# Patient Record
Sex: MALE
[De-identification: names, ages, dates, MRNs, and addresses within clinical notes are randomized per-mention and may not be internally consistent; named-entity substitution may affect disease eponyms.]

---

## 2022-12-08 ENCOUNTER — HOSPITAL ENCOUNTER (OUTPATIENT)
Dept: HOSPITAL 90 - SHCH | Age: 67
Discharge: HOME | End: 2022-12-08
Attending: INTERNAL MEDICINE
Payer: MEDICARE

## 2022-12-08 DIAGNOSIS — I08.3: Primary | ICD-10-CM

## 2022-12-08 DIAGNOSIS — E78.5: ICD-10-CM

## 2022-12-08 PROCEDURE — 93306 TTE W/DOPPLER COMPLETE: CPT

## 2022-12-12 ENCOUNTER — HOSPITAL ENCOUNTER (OUTPATIENT)
Dept: HOSPITAL 90 - SHCH | Age: 67
Discharge: HOME | End: 2022-12-12
Attending: INTERNAL MEDICINE
Payer: MEDICARE

## 2022-12-12 DIAGNOSIS — R07.9: Primary | ICD-10-CM

## 2022-12-12 PROCEDURE — 93017 CV STRESS TEST TRACING ONLY: CPT

## 2022-12-12 PROCEDURE — 96374 THER/PROPH/DIAG INJ IV PUSH: CPT

## 2022-12-12 PROCEDURE — 78452 HT MUSCLE IMAGE SPECT MULT: CPT

## 2024-09-30 ENCOUNTER — HOSPITAL ENCOUNTER (EMERGENCY)
Dept: HOSPITAL 90 - EDH | Age: 69
Discharge: HOME | End: 2024-09-30
Payer: MEDICARE

## 2024-09-30 VITALS
DIASTOLIC BLOOD PRESSURE: 80 MMHG | OXYGEN SATURATION: 99 % | RESPIRATION RATE: 16 BRPM | SYSTOLIC BLOOD PRESSURE: 160 MMHG | TEMPERATURE: 98.6 F | HEART RATE: 90 BPM

## 2024-09-30 VITALS — HEIGHT: 66 IN | WEIGHT: 175.93 LBS | BODY MASS INDEX: 28.27 KG/M2

## 2024-09-30 DIAGNOSIS — Z88.5: ICD-10-CM

## 2024-09-30 DIAGNOSIS — Z88.0: ICD-10-CM

## 2024-09-30 DIAGNOSIS — Z86.73: ICD-10-CM

## 2024-09-30 DIAGNOSIS — E78.00: ICD-10-CM

## 2024-09-30 DIAGNOSIS — N20.2: ICD-10-CM

## 2024-09-30 DIAGNOSIS — R91.1: Primary | ICD-10-CM

## 2024-09-30 LAB
BASOPHILS # BLD AUTO: 0.05 K/UL (ref 0–0.2)
BASOPHILS NFR BLD AUTO: 1 % (ref 0–5)
BUN SERPL-MCNC: 8 MG/DL (ref 7–18)
CHLORIDE SERPL-SCNC: 101 MMOL/L (ref 101–111)
CO2 SERPL-SCNC: 28 MMOL/L (ref 21–32)
CREAT SERPL-MCNC: 1.4 MG/DL (ref 0.5–1.3)
EOSINOPHIL # BLD AUTO: 0.29 K/UL (ref 0–0.7)
EOSINOPHIL NFR BLD AUTO: 5.7 % (ref 0–8)
ERYTHROCYTE [DISTWIDTH] IN BLOOD BY AUTOMATED COUNT: 13.4 % (ref 11–15.5)
GFR SERPL CREATININE-BSD FRML MDRD: 55 ML/MIN (ref 90–?)
GLUCOSE SERPL-MCNC: 168 MG/DL (ref 70–105)
HCT VFR BLD AUTO: 40.6 % (ref 42–54)
IMM GRANULOCYTES # BLD: 0.04 K/UL (ref 0–1)
LYMPHOCYTES # SPEC AUTO: 1.4 K/UL (ref 1–4.8)
LYMPHOCYTES NFR SPEC AUTO: 27.5 % (ref 21–51)
MCH RBC QN AUTO: 30 PG (ref 27–33)
MCHC RBC AUTO-ENTMCNC: 34 G/DL (ref 32–36)
MCV RBC AUTO: 88.3 FL (ref 79–99)
MONOCYTES # BLD AUTO: 0.4 K/UL (ref 0.1–1)
MONOCYTES NFR BLD AUTO: 6.9 % (ref 3–13)
NEUTROPHILS # BLD AUTO: 3 K/UL (ref 1.8–7.7)
NEUTROPHILS NFR BLD AUTO: 58.1 % (ref 40–77)
NRBC BLD MANUAL-RTO: 0 % (ref 0–0.19)
PLATELET # BLD AUTO: 183 K/UL (ref 130–400)
POTASSIUM SERPL-SCNC: 3.5 MMOL/L (ref 3.5–5.1)
RBC # BLD AUTO: 4.6 MIL/UL (ref 4.5–6.2)
SODIUM SERPL-SCNC: 135 MMOL/L (ref 136–145)
WBC # BLD AUTO: 5.1 K/UL (ref 4.8–10.8)

## 2024-09-30 PROCEDURE — 36415 COLL VENOUS BLD VENIPUNCTURE: CPT

## 2024-09-30 PROCEDURE — 93005 ELECTROCARDIOGRAM TRACING: CPT

## 2024-09-30 PROCEDURE — 85025 COMPLETE CBC W/AUTO DIFF WBC: CPT

## 2024-09-30 PROCEDURE — 83690 ASSAY OF LIPASE: CPT

## 2024-09-30 PROCEDURE — 71045 X-RAY EXAM CHEST 1 VIEW: CPT

## 2024-09-30 PROCEDURE — 84484 ASSAY OF TROPONIN QUANT: CPT

## 2024-09-30 PROCEDURE — 99285 EMERGENCY DEPT VISIT HI MDM: CPT

## 2024-09-30 PROCEDURE — 80048 BASIC METABOLIC PNL TOTAL CA: CPT

## 2024-09-30 PROCEDURE — 96374 THER/PROPH/DIAG INJ IV PUSH: CPT

## 2024-09-30 PROCEDURE — 74176 CT ABD & PELVIS W/O CONTRAST: CPT

## 2024-10-21 ENCOUNTER — HOSPITAL ENCOUNTER (OUTPATIENT)
Dept: HOSPITAL 90 - RAH | Age: 69
Discharge: HOME | End: 2024-10-21
Attending: INTERNAL MEDICINE
Payer: MEDICARE

## 2024-10-21 DIAGNOSIS — M47.815: ICD-10-CM

## 2024-10-21 DIAGNOSIS — R07.9: Primary | ICD-10-CM

## 2024-10-21 PROCEDURE — 75574 CT ANGIO HRT W/3D IMAGE: CPT

## 2025-01-28 ENCOUNTER — HOSPITAL ENCOUNTER (EMERGENCY)
Dept: HOSPITAL 90 - EDH | Age: 70
Discharge: HOME | End: 2025-01-28
Payer: MEDICARE

## 2025-01-28 VITALS
TEMPERATURE: 97.8 F | RESPIRATION RATE: 18 BRPM | OXYGEN SATURATION: 98 % | DIASTOLIC BLOOD PRESSURE: 78 MMHG | HEART RATE: 77 BPM | SYSTOLIC BLOOD PRESSURE: 129 MMHG

## 2025-01-28 VITALS — WEIGHT: 179.9 LBS | HEIGHT: 66 IN | BODY MASS INDEX: 28.91 KG/M2

## 2025-01-28 DIAGNOSIS — R07.89: Primary | ICD-10-CM

## 2025-01-28 DIAGNOSIS — Z88.0: ICD-10-CM

## 2025-01-28 DIAGNOSIS — Z79.899: ICD-10-CM

## 2025-01-28 DIAGNOSIS — Z88.5: ICD-10-CM

## 2025-01-28 LAB
ALBUMIN SERPL-MCNC: 3.8 G/DL (ref 3.5–5)
ALT SERPL-CCNC: 22 U/L (ref 12–78)
AMPHETAMINES UR QL SCN: NEGATIVE
APPEARANCE UR: CLEAR
APTT PPP: 28.3 SEC (ref 26.3–35.5)
AST SERPL-CCNC: 16 U/L (ref 10–37)
BARBITURATES UR QL SCN: NEGATIVE
BASOPHILS # BLD AUTO: 0.07 K/UL (ref 0–0.2)
BASOPHILS NFR BLD AUTO: 1.1 % (ref 0–5)
BENZODIAZ UR QL SCN: NEGATIVE
BILIRUB DIRECT SERPL-MCNC: 0.2 MG/DL (ref 0–0.3)
BILIRUB SERPL-MCNC: 0.5 MG/DL (ref 0.2–1)
BILIRUB UR QL STRIP: NEGATIVE MG/DL
BNP SERPL-MCNC: 11 PG/ML (ref 0–100)
BUN SERPL-MCNC: 12 MG/DL (ref 7–18)
BZE UR QL SCN: NEGATIVE
CANNABINOIDS UR QL SCN: NEGATIVE
CHLORIDE SERPL-SCNC: 101 MMOL/L (ref 101–111)
CK SERPL-CCNC: 75 U/L (ref 21–232)
CO2 SERPL-SCNC: 29 MMOL/L (ref 21–32)
COLOR UR: (no result)
CREAT SERPL-MCNC: 1.3 MG/DL (ref 0.5–1.3)
DEPRECATED SQUAMOUS URNS QL MICRO: (no result) /HPF (ref 0–2)
EOSINOPHIL # BLD AUTO: 0.24 K/UL (ref 0–0.7)
EOSINOPHIL NFR BLD AUTO: 3.6 % (ref 0–8)
ERYTHROCYTE [DISTWIDTH] IN BLOOD BY AUTOMATED COUNT: 13.3 % (ref 11–15.5)
GFR SERPL CREATININE-BSD FRML MDRD: 60 ML/MIN (ref 90–?)
GLUCOSE SERPL-MCNC: 92 MG/DL (ref 70–105)
GLUCOSE UR STRIP-MCNC: 150 MG/DL
HCT VFR BLD AUTO: 45.6 % (ref 42–54)
HGB UR QL STRIP: (no result)
IMM GRANULOCYTES # BLD: 0.05 K/UL (ref 0–1)
INR PPP: 0.94 (ref 0.85–1.15)
KETONES UR STRIP-MCNC: NEGATIVE MG/DL
LEUKOCYTE ESTERASE UR QL STRIP: 25 LEU/UL
LYMPHOCYTES # SPEC AUTO: 2.2 K/UL (ref 1–4.8)
LYMPHOCYTES NFR SPEC AUTO: 33 % (ref 21–51)
MAGNESIUM SERPL-MCNC: 1.9 MG/DL (ref 1.8–2.4)
MCH RBC QN AUTO: 29.9 PG (ref 27–33)
MCHC RBC AUTO-ENTMCNC: 34.4 G/DL (ref 32–36)
MCV RBC AUTO: 86.9 FL (ref 79–99)
MICRO URNS: YES
MONOCYTES # BLD AUTO: 0.6 K/UL (ref 0.1–1)
MONOCYTES NFR BLD AUTO: 8.5 % (ref 3–13)
MUCOUS THREADS URNS QL MICRO: (no result) LPF
NEUTROPHILS # BLD AUTO: 3.5 K/UL (ref 1.8–7.7)
NEUTROPHILS NFR BLD AUTO: 53 % (ref 40–77)
NITRITE UR QL STRIP: NEGATIVE
NRBC BLD MANUAL-RTO: 0 % (ref 0–0.19)
OPIATES UR QL SCN: NEGATIVE
PCP UR QL SCN: NEGATIVE
PH UR STRIP: 6 [PH] (ref 5–8)
PLATELET # BLD AUTO: 190 K/UL (ref 130–400)
POTASSIUM SERPL-SCNC: 4 MMOL/L (ref 3.5–5.1)
PROT SERPL-MCNC: 7.6 G/DL (ref 6–8.3)
PROT UR QL STRIP: NEGATIVE MG/DL
PROTHROMBIN TIME: 10.6 SEC (ref 9.6–11.6)
RBC # BLD AUTO: 5.25 MIL/UL (ref 4.5–6.2)
RBC #/AREA URNS HPF: (no result) /HPF (ref 0–1)
SODIUM SERPL-SCNC: 136 MMOL/L (ref 136–145)
SP GR UR STRIP: 1.02 (ref 1–1.03)
UROBILINOGEN UR STRIP-MCNC: 0.2 MG/DL (ref 0.2–1)
WBC # BLD AUTO: 6.6 K/UL (ref 4.8–10.8)
WBC #/AREA URNS HPF: (no result) /HPF (ref 0–1)

## 2025-01-28 PROCEDURE — 99285 EMERGENCY DEPT VISIT HI MDM: CPT

## 2025-01-28 PROCEDURE — 80048 BASIC METABOLIC PNL TOTAL CA: CPT

## 2025-01-28 PROCEDURE — 87086 URINE CULTURE/COLONY COUNT: CPT

## 2025-01-28 PROCEDURE — 85610 PROTHROMBIN TIME: CPT

## 2025-01-28 PROCEDURE — 83880 ASSAY OF NATRIURETIC PEPTIDE: CPT

## 2025-01-28 PROCEDURE — 81001 URINALYSIS AUTO W/SCOPE: CPT

## 2025-01-28 PROCEDURE — 83690 ASSAY OF LIPASE: CPT

## 2025-01-28 PROCEDURE — 80305 DRUG TEST PRSMV DIR OPT OBS: CPT

## 2025-01-28 PROCEDURE — 85025 COMPLETE CBC W/AUTO DIFF WBC: CPT

## 2025-01-28 PROCEDURE — 80076 HEPATIC FUNCTION PANEL: CPT

## 2025-01-28 PROCEDURE — 85730 THROMBOPLASTIN TIME PARTIAL: CPT

## 2025-01-28 PROCEDURE — 71045 X-RAY EXAM CHEST 1 VIEW: CPT

## 2025-01-28 PROCEDURE — 83735 ASSAY OF MAGNESIUM: CPT

## 2025-01-28 PROCEDURE — 82550 ASSAY OF CK (CPK): CPT

## 2025-01-28 PROCEDURE — 36415 COLL VENOUS BLD VENIPUNCTURE: CPT

## 2025-01-28 PROCEDURE — 84484 ASSAY OF TROPONIN QUANT: CPT

## 2025-01-28 PROCEDURE — 93005 ELECTROCARDIOGRAM TRACING: CPT

## 2025-01-28 NOTE — HMCIMG
CHEST 1VW



REASON: cp



COMPARISON: 9/30/2024



FINDINGS:  

Single view of the chest was obtained. Lungs are clear. Heart size is

normal. There is no pulmonary vascular congestion. Mediastinum and bony

thorax appear unremarkable.



IMPRESSION:

1. Normal single view chest x-ray.

## 2025-01-28 NOTE — EKG
[facility]

                                       

Test Date:    2025               Test Time:    17:29:37

Pat Name:     ERIK CHAMORRO         Department:   [facility]

Patient ID:   [id number]           Room:          

Gender:       M                        Technician:   [id number]

:          1955               Requested By: NUBIA ORTIZ

Order Number: [id number]        Reading MD:   Floyd Ceron

                                 Measurements

Intervals                              Axis          

Rate:         80                       P:            65

MA:           132                      QRS:          -44

QRSD:         92                       T:            47

QT:           359                                    

QTc:          415                                    

                           Interpretive Statements

Sinus rhythm

Left axis deviation

Compared to ECG 2024 17:27:21

No significant changes

Electronically Signed On 2025 20:01:55 CST by Floyd Ceron



Please click the below link to view image of tracing.

## 2025-01-28 NOTE — ERN
ED Note


History of Present Illness


Stated Complaint:  CP, NUMBNESS TO LEFT ARM


Chief Complaint:  Chest Pain


Time Seen by MD:  17:04


Time Seen by Midlevel:  17:04


Dictation:


The patient is a 69-year-old male with no significant history who presents to 

the emergency department with complaints of chest pressure and shortness of 

breath, radiating to the left arm onset two days ago.  Patient reports pain as 

constant but reports he has been getting worse as time goes by.  Denies any 

cough denies any fevers denies any nausea or vomiting.


Allergies:  


Coded Allergies:  


     Penicillins (Unverified  Allergy, Unknown, 5/24/17)


     codeine (Unverified  Allergy, Unknown, 8/3/22)


     tramadol (Unverified  Allergy, Unknown, 8/4/22)


Home Meds


Reported Medications


Tramadol Hcl (Tramadol HCl) 50 Mg Tablet, 50 MG PO Q6HPRN PRN for PAIN LEVEL 7 

TO 10, #8 TAB


   8/4/22


Ibuprofen (Ibuprofen 800 mg Tab) 800 Mg Tab, 800 MG PO AD PRN for pain, TAB


   8/1/22





Past Medical History


Past Medical History:  No Pertinent History


Surgical History:  None


RN Note Reviewed/Agreed w/PFSH:  Yes





Review of System


Dictation


Constitutional: Negative for fever,chills, and weight loss


Eyes: Negative for injury, pain,redness, and discharge


ENT: Negative for injury,pain or swelling


Cardiovascular: Negative for  palpitations, and edema positive for chest pain


Respiratory: Negative for , cough, and wheezing, positive for shortness of 

breath


Abdomen/GI: Negative for abdominal pain, nausea, vomiting, diarrhea, and cons

tipation


Back: Negative for injury and pain


: Negative for injury, bleeding and discharge


MS/Extremity: Negative for injury and deformity


Skin: Negative for rash, and discoloration


Neuro: Negative for headache, weakness,  tingling, and seizure positive for left

arm numbness


Psych: Negative for suicide ideation, homicidal ideation, and hallucinations





Initial Vital Sign


VS





                                   Vital Signs








  Date Time  Temp Pulse Resp B/P (MAP) Pulse Ox O2 Delivery O2 Flow Rate FiO2


 


1/28/25 18:04 98.1 91 20 123/81 96 Room Air 0 











Physical Exam


Dictation


Vital Signs reviewed 


General Appearance: Alert, oriented x 3, no acute distress, well developed, 

nourished. 


Head and Face: non-traumatic.


Eyes: PERRL, pink conjunctivas, eyelid no trauma, anterior chamber with arcus 

senilis. 


Ears: Pinnas intact and no signs of trauma or erythema ear canals clear and no 

discharge TM no erythema 


Nose: No discharge, no bleeding. 


Oropharynx: Mouth normal, tongue pink.


pharynx clear,no erythema, tonsils no exudates, no abscesses noted,  mucous 

membrane moist 


Neck: Supple, non-tender, no thyromegaly, no masses, no JVD, no bruits 


Breast:Deferred 


Chest:No tenderness, no crepitus, no paradoxical movement, no retractions 


Lungs:Clear, well-ventilated, symmetric, no rales, no wheezing, no rhonchi, no 

stridor, good breath sounds bilaterally 


Heart: Regular rate, regular rhythm, no murmur, no gallops 


Vascular: no peripheral edema, 


Abdomen: Soft, positive bowel sounds, nondistended, no guarding, 


nontender, no rebound, no masses no hepatomegaly, no splenomegaly, no Buckley's 

sign, no hernias.


Rectal: Deferred


Genital: Deferred


Neurological: Normal speech,  motor function intact, sensory function intact , 

no facial droop, no slurred speech, upper extremities equal and strength, lower 

extremities equal and strength


Musculoskeletal: Neck nontender, full range of motion, back nontender, full 

range of motion,


Extremities: nontender, full range of motion 


Skin: Color pink, dry, no turgor, no rash, no lacerations, no abrasions, no 

contusions.


Lymphatic: Deferred





Results (Laboratory/Radiology)


Laboratory/Radiology





Laboratory Tests








Test


 1/28/25


18:48 1/28/25


18:50 1/28/25


20:41


 


White Blood Count


 6.6 K/uL


(4.8-10.8) 


 





 


Red Blood Count


 5.25 MIL/uL


(4.50-6.20) 


 





 


Hemoglobin


 15.7 g/dL


(14.0-18.0) 


 





 


Hematocrit


 45.6 % (42-54)


 


 





 


Mean Corpuscular Volume


 86.9 fL


(79-99) 


 





 


Mean Corpuscular Hemoglobin


 29.9 pg


(27.0-33.0) 


 





 


Mean Corpuscular Hemoglobin


Concent 34.4 g/dL


(32.0-36.0) 


 





 


Red Cell Distribution Width


 13.3 %


(11.0-15.5) 


 





 


Platelet Count


 190 K/uL


(130-400) 


 





 


Mean Platelet Volume


 10.1 fL


(7.5-10.5) 


 





 


Immature Granulocyte % (Auto) 0.8 % (0-1)    


 


Neutrophils (%) (Auto)


 53.0 %


(40.0-77.0) 


 





 


Lymphocytes (%) (Auto)


 33.0 %


(21.0-51.0) 


 





 


Monocytes (%) (Auto)


 8.5 %


(3.0-13.0) 


 





 


Eosinophils (%) (Auto)


 3.6 %


(0.0-8.0) 


 





 


Basophils (%) (Auto)


 1.1 %


(0.0-5.0) 


 





 


Neutrophils # (Auto)


 3.5 K/uL


(1.8-7.7) 


 





 


Lymphocytes # (Auto)


 2.2 K/uL


(1.0-4.8) 


 





 


Monocytes # (Auto)


 0.6 K/uL


(0.1-1.0) 


 





 


Eosinophils # (Auto)


 0.24 K/uL


(0.00-0.70) 


 





 


Basophils # (Auto)


 0.07 K/uL


(0.00-0.20) 


 





 


Absolute Immature Granulocyte


(auto 0.05 K/uL


(0-1) 


 





 


Nucleated Red Blood Cells


 0.0 %


(0.0-0.19) 


 





 


Prothrombin Time


 10.6 SEC


(9.6-11.6) 


 





 


Prothromb Time International


Ratio 0.94


(0.85-1.15) 


 





 


Activated Partial


Thromboplast Time 28.3 SEC


(26.3-35.5) 


 





 


Sodium Level


 136 mmol/L


(136-145) 


 





 


Potassium Level


 4.0 mmol/L


(3.5-5.1) 


 





 


Chloride Level


 101 mmol/L


(101-111) 


 





 


Carbon Dioxide Level


 29 mmol/L


(21-32) 


 





 


Blood Urea Nitrogen


 12 mg/dL


(7-18) 


 





 


Creatinine


 1.3 mg/dL


(0.5-1.3) 


 





 


Glomerular Filtration Rate


Calc 60 mL/min


(>90) 


 





 


Random Glucose


 92 mg/dL


() 


 





 


Total Calcium


 8.8 mg/dL


(8.5-10.1) 


 





 


Magnesium Level


 1.90 mg/dL


(1.80-2.40) 


 





 


Total Bilirubin


 0.5 mg/dL


(0.2-1.0) 


 





 


Direct Bilirubin


 0.2 mg/dL


(0.0-0.3) 


 





 


Aspartate Amino Transf


(AST/SGOT) 16 U/L (10-37)


 


 





 


Alanine Aminotransferase


(ALT/SGPT) 22 U/L (12-78)


 


 





 


Alkaline Phosphatase


 64 U/L


() 


 





 


Total Creatine Kinase


 75 U/L


() 


 





 


Troponin I High Sensitivity


 30 ng/L (4-75)


 


 29 ng/L (4-75)





 


B-Type Natriuretic Peptide


 11 pg/mL


(0-100) 


 





 


Total Protein


 7.6 g/dL


(6.0-8.3) 


 





 


Albumin


 3.8 g/dL


(3.5-5.0) 


 





 


Lipase


 54 U/L (16-77)


 


 





 


Urine Color


 


 LIGHT-YELLOW


(YELLOW) 





 


Urine Appearance  CLEAR (CLEAR)   


 


Urine pH  6.0 (5.0-8.0)   


 


Urine Specific Gravity


 


 1.017


(1.001-1.031) 





 


Urine Protein


 


 NEGATIVE mg/dL


(NEGATIVE) 





 


Urine Glucose (UA)


 


 150 mg/dL


(NEGATIVE)  H 





 


Urine Ketones


 


 NEGATIVE mg/dL


(NEGATIVE) 





 


Urine Occult Blood


 


 +- (TRACE)


(NEGATIVE)  H 





 


Urine Nitrate


 


 NEGATIVE


(NEGATIVE) 





 


Urine Bilirubin


 


 NEGATIVE mg/dL


(NEGATIVE) 





 


Urine Urobilinogen


 


 0.2 mg/dL


(0.2-1.0) 





 


Urine Leukocyte Esterase


 


 25 Jakub/uL


(NEGATIVE)  H 





 


Urine RBC


 


 6-10 /HPF


(0-1)  H 





 


Urine WBC


 


 6-10 /HPF


(0-1)  H 





 


Urine Squamous Epithelial


Cells 


 RARE /HPF


(0-2) 





 


Urine Bacteria


 


 None /HPF


(None Seen) 





 


Urine Opiates Screen


 


 NEGATIVE


(NEGATIVE) 





 


Urine Barbiturates Screen


 


 NEGATIVE


(NEGATIVE) 





 


Urine Phencyclidine Screen


 


 NEGATIVE


(NEGATIVE) 





 


Urine Amphetamines Screen


 


 NEGATIVE


(NEGATIVE) 





 


Urine Benzodiazepines Screen


 


 NEGATIVE


(NEGATIVE) 





 


Urine Cocaine Screen


 


 NEGATIVE


(NEGATIVE) 





 


Urine Marijuana (THC) Screen


 


 NEGATIVE


(NEGATIVE) 








REASON: cp


ORDERING PHYSICIAN: NUBIA ORTIZ ISABEL


PROCEDURE: CXR1VW  -  CHEST 1VW





CHEST 1VW





REASON: cp





COMPARISON: 9/30/2024





FINDINGS:  


Single view of the chest was obtained. Lungs are clear. Heart size is


normal. There is no pulmonary vascular congestion. Mediastinum and bony


thorax appear unremarkable.





IMPRESSION:


1. Normal single view chest x-ray.





Labs Reviewed?:  Yes


EKG:  (+) rhythm (Sinus rhythm)


EKG Comment:


Date:1/28/2025


Time:1729


Ventricular rate:80


MS interval:132


QRS duration:92


QT/QTc:359


EKG interpretation:  Sinus rhythm


Reviewed by ED Attending no STEMI





ED Course


ED Course





Orders








Procedure Category Date Status





  Time 


 


Cbc With Differential LAB 1/28/25 Complete





  17:33 


 


B-Type Natriuretic LAB 1/28/25 Complete





Peptide  17:33 


 


Chest 1vw RAD 1/28/25 Resulted





  17:33 


 


12 Lead Ekg Tracing- EKG 1/28/25 Complete





Technical  17:33 


 


Nitroglycerin 1gm PHA 1/28/25 Complete





Oint (Nitroglycerin 1g  18:00 


 


Ondansetron 4mg Inj PHA 1/28/25 Complete





 (Zofran 4mg Inj)  18:00 


 


Magnesium LAB 1/28/25 Complete





  17:33 


 


Creatine Kinase, Total LAB 1/28/25 Complete





  17:33 


 


Troponin I High LAB 1/28/25 Complete





Sensitivity  17:33 


 


Aspirin 325mg Tab PHA 1/28/25 Complete





 (Aspirin 325mg Tab)  18:00 


 


Urinalysis Profile LAB 1/28/25 Complete





  17:33 


 


Basic Metabolic Panel LAB 1/28/25 Complete





  17:33 


 


Pt And Ptt LAB 1/28/25 Complete





  17:33 


 


Lipase LAB 1/28/25 Complete





  17:33 


 


Hepatic Function Panel LAB 1/28/25 Complete





  17:33 


 


Drug Screen Urine LAB 1/28/25 Complete





  17:33 


 


Culture Urine SILAS 1/28/25 In Process





  19:12 


 


Troponin I High LAB 1/28/25 Complete





Sensitivity  19:24 








Current Medications








 Medications


  (Trade)  Dose


 Ordered  Sig/Stew


 Route


 PRN Reason  Start Time


 Stop Time Status Last Admin


Dose Admin


 


 Aspirin


  (Aspirin 325mg


 Tab)  325 mg  ONCE  ONCE


 PO


   1/28/25 18:00


 1/28/25 18:01 DC  





 


 Nitroglycerin


  (Nitroglycerin


 1gm Oint)  1 inch  ONCE  ONCE


 TD


   1/28/25 18:00


 1/28/25 18:01 DC  





 


 Ondansetron HCl


  (zoFRAN 4MG INJ)  4 mg  ONCE  ONCE


 IVP


   1/28/25 18:00


 1/28/25 19:24 DC  











Vital Signs








  Date Time  Temp Pulse Resp B/P (MAP) Pulse Ox O2 Delivery O2 Flow Rate FiO2


 


1/28/25 18:04 98.1 91 20 123/81 96 Room Air 0 

















HEART Score Response (Comments) Value


 


History: Moderate suspicion (+1) 1


 


EKG: Normal 0


 


Age:                                    45-65yrs (+1) 1


 


Risk Factors:                           1-2 risk factors (+1) 1


 


Initial Troponin: Normal limit (0) 0


 


Total  3











Medical Decision Making


[facility] The patient is a 69-year-old male with no significant history who presents 

to the emergency department with complaints of chest pressure and shortness of 

breath, radiating to the left arm onset two days ago.  Patient reports pain as 

constant but reports he has been getting worse as time goes by.  Denies any 

cough denies any fevers denies any nausea or vomiting.


CBC showed no leukocytosis, no anemia, chemistry showed no electrolyte 

imbalance, negative lipase, negative troponin x2, negative BNP, chest x-ray 

unremarkable, EKG with sinus rhythm.  Patient had a coronary angiogram last year

in October and only showed mild CAD with 10-20% stenosis.  Patient reports also 

multiple cardiac workup with a his cardiologists including stress test with no 

significant abnormalities.  Patient with no chest pain at this time.  Labs and 

images discussed with the patient and I gave the patient the option to be 

admitted to the hospital for further evaluation.  At this time patient does not 

want to be admitted and would rather follow up with his cardiologist.  Patient 

in no acute distress will be discharged to follow up with his cardiologist.


Differential diagnosis:  ACS, pneumonia, pneumothorax, anxiety, electrolyte 

imbalance





Need for hospitalization: Patient does not meet criteria for hospitalization.





There are no social concerns with this patient.





DX & DISP


Disposition:  Discharge


Departure


Impression:  


   Primary Impression:  Atypical chest pain


Condition:  Stable





Additional Instructions:  


Please follow up with your primary doctor in 1-2 days.  If symptoms worsen 

please return to ER.  Please follow up with your cardiologist.


FOLLOW-UP WITH PRIMARY CARE PROVIDER IN 1 TO 2 DAYS.  TAKE MEDICATIONS AS 

DIRECTED HERE IN THE EMERGENCY ROOM.  OKAY TO CONTINUE HOME MEDICATIONS UNLESS 

OTHERWISE DISCUSSED DURING YOUR VISIT IN THE EMERGENCY ROOM TODAY.  RETURN TO 

YOUR NEAREST EMERGENCY ROOM IF SYMPTOMS WORSEN OR IF THERE IS NO IMPROVEMENT.  

CALL 911 IF YOU NEED IMMEDIATE ASSISTANCE.  TAKE TYLENOL OR MOTRIN 

OVER-THE-COUNTER AS NEEDED AND IF NO CONTRAINDICATIONS ARE PRESENT.  INCREASE 

ORAL HYDRATION.  A WOUND CULTURE OR URINE CULTURE WAS ORDERED HERE IN THE EM

ERGENCY ROOM DEPARTMENT PLEASE FOLLOW-UP WITH PRIMARY CARE PROVIDER AND ADVISE 

THEM TO GET REPEAT PORTS FROM OUR FACILITY.  IF YOU HAD ANY ACE WRAP/SPLINTS 

THAT WERE APPLIED HERE, PLEASE DO NOT REMOVE THEM UNTIL YOU SEE YOUR PRIMARY 

CARE OR SPECIALTY.


Referrals:  


INGE HUITRON MD (PCP)


Time of Disposition:  21:40


I have reviewed the case, and I agree with, Diagnosis and Plan











NUBIA ORTIZ [facility]               Jan 28, 2025 18:27